# Patient Record
Sex: MALE | Race: WHITE | URBAN - METROPOLITAN AREA
[De-identification: names, ages, dates, MRNs, and addresses within clinical notes are randomized per-mention and may not be internally consistent; named-entity substitution may affect disease eponyms.]

---

## 2018-05-09 NOTE — PROGRESS NOTES
"  SUBJECTIVE:   Xiang David is a 23 year old male who presents to clinic today for the following health issues:      HPI     Concern - Cysts  Onset: 4 years?    Description:   \"covered in them\"  Arms, both upper and lower back, thighs    Progression of Symptoms:  worsening    Accompanying Signs & Symptoms:  Pain on some of them, herbert the ones on sides  One on left arm looks to be getting bigger    Previous history of similar problem:   YES - previously had 3 cysts removed that were noncancerous    Precipitating factors:   Worsened by: touching them    Alleviating factors:  Improved by: NA    Therapies Tried and outcome: NA    He states they are lipomas but they have become more bothersome over the years and he would like some of them removed.     Problem list and histories reviewed & adjusted, as indicated.  Additional history: none    ROS:  GENERAL: Denies fever, fatigue, weakness, weight gain, or weight loss.   SKIN: +Multiple lipomas, some which are painful.    OBJECTIVE:     /62 (BP Location: Right arm, Patient Position: Chair, Cuff Size: Adult Regular)  Pulse 96  Temp 98.1  F (36.7  C) (Temporal)  Resp 14  Ht 6' (1.829 m)  Wt 241 lb (109.3 kg)  SpO2 98%  BMI 32.69 kg/m2  Body mass index is 32.69 kg/(m^2).  GENERAL: healthy, alert and no distress  SKIN: +Multiple, rubbery, subcutaneous nodules covering bilateral arms, chest, flanks and thighs.     ASSESSMENT/PLAN:       ICD-10-CM    1. Lipomatosis E88.2 GENERAL SURG ADULT REFERRAL       Patient has dozens of lipomas all over his body, some of which are painful. Will send to general surgery for further evaluation to discuss removal.       Hilario Townsend PA-C  [facility]"

## 2018-05-16 ENCOUNTER — OFFICE VISIT (OUTPATIENT)
Dept: FAMILY MEDICINE | Facility: OTHER | Age: 23
End: 2018-05-16
Payer: COMMERCIAL

## 2018-05-16 VITALS
OXYGEN SATURATION: 98 % | HEIGHT: 72 IN | DIASTOLIC BLOOD PRESSURE: 62 MMHG | SYSTOLIC BLOOD PRESSURE: 128 MMHG | HEART RATE: 96 BPM | RESPIRATION RATE: 14 BRPM | BODY MASS INDEX: 32.64 KG/M2 | TEMPERATURE: 98.1 F | WEIGHT: 241 LBS

## 2018-05-16 DIAGNOSIS — E88.2 LIPOMATOSIS: Primary | ICD-10-CM

## 2018-05-16 PROCEDURE — 99202 OFFICE O/P NEW SF 15 MIN: CPT | Performed by: PHYSICIAN ASSISTANT

## 2018-05-16 ASSESSMENT — PAIN SCALES - GENERAL: PAINLEVEL: NO PAIN (0)

## 2018-05-16 NOTE — NURSING NOTE
Chief Complaint   Patient presents with     Derm Problem     Panel Management     tdap, gab, jesus, hiv screen        Initial /62 (BP Location: Right arm, Patient Position: Chair, Cuff Size: Adult Regular)  Pulse 96  Temp 98.1  F (36.7  C) (Temporal)  Resp 14  Ht 6' (1.829 m)  Wt 241 lb (109.3 kg)  SpO2 98%  BMI 32.69 kg/m2 Estimated body mass index is 32.69 kg/(m^2) as calculated from the following:    Height as of this encounter: 6' (1.829 m).    Weight as of this encounter: 241 lb (109.3 kg).  Medication Reconciliation: complete    Bell Caruso, CMA

## 2018-05-16 NOTE — MR AVS SNAPSHOT
After Visit Summary   5/16/2018    Xiang David    MRN: [id number]           Patient Information     Date Of Birth          1995        Visit Information        Provider Department      5/16/2018 3:00 PM Hilario Townsend PA-C [facility]        Today's Diagnoses     Lipomatosis    -  1      Care Instructions    Will send to general surgery to discuss removal of the lipomas.            Follow-ups after your visit        Additional Services     GENERAL SURG ADULT REFERRAL       Your provider has referred you to: [facility]: [facility] [phone]   [email or web address]    Dozens of lipomas, discuss removal of some that are bothersome    Please be aware that coverage of these services is subject to the terms and limitations of your health insurance plan.  Call member services at your health plan with any benefit or coverage questions.      Please bring the following with you to your appointment:    (1) Any X-Rays, CTs or MRIs which have been performed.  Contact the facility where they were done to arrange for  prior to your scheduled appointment.   (2) List of current medications   (3) This referral request   (4) Any documents/labs given to you for this referral                  Your next 10 appointments already scheduled     May 21, 2018  8:45 AM CDT   New Visit with Ronnie Olmos MD   [facility] ([facility])    [address] [place] [address] [address]  [facility] [id number] [phone]              Who to contact     If you have questions or need follow up information about today's clinic visit or your schedule please contact [facility] directly at [phone].  Normal or non-critical lab and imaging results will be communicated to you by MyChart, letter or phone within 4 business days after the clinic has received the results. If you do not hear from us within 7 days, please contact  "the clinic through SmartStarthart or phone. If you have a critical or abnormal lab result, we will notify you by phone as soon as possible.  Submit refill requests through Tabber or call your pharmacy and they will forward the refill request to us. Please allow 3 business days for your refill to be completed.          Additional Information About Your Visit        SmartStarthart Information     Tabber lets you send messages to your doctor, view your test results, renew your prescriptions, schedule appointments and more. To sign up, go to [email or web address] . Click on \"Log in\" on the left side of the screen, which will take you to the Welcome page. Then click on \"Sign up Now\" on the right side of the page.     You will be asked to enter the access code listed below, as well as some personal information. Please follow the directions to create your username and password.     Your access code is: [email or web address]  Expires: 2018  3:24 PM     Your access code will  in 90 days. If you need help or a new code, please call your [place] clinic or [phone].        Care EveryWhere ID     This is your Care EveryWhere ID. This could be used by other organizations to access your [place] medical records  [id number]        Your Vitals Were     Pulse Temperature Respirations Height Pulse Oximetry BMI (Body Mass Index)    96 98.1  F (36.7  C) (Temporal) 14 6' (1.829 m) 98% 32.69 kg/m2       Blood Pressure from Last 3 Encounters:   18 128/62   11 122/60   11 128/78    Weight from Last 3 Encounters:   18 241 lb (109.3 kg)   11 228 lb 4 oz (103.5 kg) (99 %)*   11 221 lb (100.2 kg) (98 %)*     * Growth percentiles are based on CDC 2-20 Years data.              We Performed the Following     GENERAL SURG ADULT REFERRAL        Primary Care Provider Office Phone # Fax #    Hira Usama Andrews [phone]       [address] [place] [address]        Equal Access to " Services     [facility]: Hadii aad ku hadharmonykymberly Sothiago, waaxda luqadaha, qaybta kaalmada jag, dina ziegler. So [facility] [phone].    ATENCIÓN: Si habla español, tiene a ramirez disposición servicios gratuitos de asistencia lingüística. Llame al [phone].    We comply with applicable federal civil rights laws and [place] laws. We do not discriminate on the basis of race, color, national origin, age, disability, sex, sexual orientation, or gender identity.            Thank you!     Thank you for choosing [facility]  for your care. Our goal is always to provide you with excellent care. Hearing back from our patients is one way we can continue to improve our services. Please take a few minutes to complete the written survey that you may receive in the mail after your visit with us. Thank you!             Your Updated Medication List - Protect others around you: Learn how to safely use, store and throw away your medicines at www.disposemymeds.org.          This list is accurate as of 5/16/18  3:24 PM.  Always use your most recent med list.                   Brand Name Dispense Instructions for use Diagnosis    amphetamine-dextroamphetamine 20 MG per 24 hr capsule    ADDERALL XR    30 capsule    Take 1 capsule by mouth daily. Generic okay    ADD (attention deficit disorder)

## 2018-05-21 ENCOUNTER — TELEPHONE (OUTPATIENT)
Dept: SURGERY | Facility: CLINIC | Age: 23
End: 2018-05-21

## 2018-05-21 ENCOUNTER — OFFICE VISIT (OUTPATIENT)
Dept: SURGERY | Facility: OTHER | Age: 23
End: 2018-05-21
Attending: PHYSICIAN ASSISTANT
Payer: COMMERCIAL

## 2018-05-21 VITALS
HEART RATE: 76 BPM | OXYGEN SATURATION: 99 % | WEIGHT: 246 LBS | BODY MASS INDEX: 33.32 KG/M2 | HEIGHT: 72 IN | SYSTOLIC BLOOD PRESSURE: 139 MMHG | DIASTOLIC BLOOD PRESSURE: 69 MMHG

## 2018-05-21 DIAGNOSIS — D17.9 LIPOMA, UNSPECIFIED SITE: Primary | ICD-10-CM

## 2018-05-21 PROCEDURE — 99243 OFF/OP CNSLTJ NEW/EST LOW 30: CPT | Performed by: SURGERY

## 2018-05-21 NOTE — MR AVS SNAPSHOT
"              After Visit Summary   2018    Xiang David    MRN: [id number]           Patient Information     Date Of Birth          1995        Visit Information        Provider Department      2018 8:45 AM Ronnie Olmos MD [facility]        Today's Diagnoses     Lipoma, unspecified site    -  1       Follow-ups after your visit        Who to contact     If you have questions or need follow up information about today's clinic visit or your schedule please contact [facility] directly at [phone].  Normal or non-critical lab and imaging results will be communicated to you by Adayanahart, letter or phone within 4 business days after the clinic has received the results. If you do not hear from us within 7 days, please contact the clinic through Adayanahart or phone. If you have a critical or abnormal lab result, we will notify you by phone as soon as possible.  Submit refill requests through The Noun Project or call your pharmacy and they will forward the refill request to us. Please allow 3 business days for your refill to be completed.          Additional Information About Your Visit        MyChart Information     [employer] lets you send messages to your doctor, view your test results, renew your prescriptions, schedule appointments and more. To sign up, go to [email or web address] . Click on \"Log in\" on the left side of the screen, which will take you to the Welcome page. Then click on \"Sign up Now\" on the right side of the page.     You will be asked to enter the access code listed below, as well as some personal information. Please follow the directions to create your username and password.     Your access code is: [email or web address]  Expires: 2018  3:24 PM     Your access code will  in 90 days. If you need help or a new code, please call your [facility] or [phone].        Care EveryWhere ID     This is your Care EveryWhere ID. This could be used by other " organizations to access your [place] medical records  [id number]        Your Vitals Were     Pulse Height Pulse Oximetry BMI (Body Mass Index)          76 1.829 m (6') 99% 33.36 kg/m2         Blood Pressure from Last 3 Encounters:   05/21/18 139/69   05/16/18 128/62   12/02/11 122/60    Weight from Last 3 Encounters:   05/21/18 111.6 kg (246 lb)   05/16/18 109.3 kg (241 lb)   12/02/11 103.5 kg (228 lb 4 oz) (99 %)*     * Growth percentiles are based on [facility] 2-20 Years data.              We Performed the Following     [place]-Operative Worksheet        Primary Care Provider Office Phone # Fax #    Hira Usama Andrews [phone]       [address] [place] [address]        Equal Access to Services     [facility]: Hadii wilson landiso Sothiago, waaxda luqadaha, qaybta kaalmada adewilmanyada, dina lainez . So [facility] [phone].    ATENCIÓN: Si habla español, tiene a ramirez disposición servicios gratuitos de asistencia lingüística. Llame al [phone].    We comply with applicable federal civil rights laws and [place] laws. We do not discriminate on the basis of race, color, national origin, age, disability, sex, sexual orientation, or gender identity.            Thank you!     Thank you for choosing [facility]  for your care. Our goal is always to provide you with excellent care. Hearing back from our patients is one way we can continue to improve our services. Please take a few minutes to complete the written survey that you may receive in the mail after your visit with us. Thank you!             Your Updated Medication List - Protect others around you: Learn how to safely use, store and throw away your medicines at www.disposemymeds.org.      Notice  As of 5/21/2018 10:26 AM    You have not been prescribed any medications.

## 2018-05-21 NOTE — LETTER
5/21/2018         RE: Xiang David  [address] [address] [address]        Dear Colleague,    Thank you for referring your patient, Xiang David, to the [facility]. Please see a copy of my visit note below.    General Surgery Consultation    Xiang David MRN# [id number]   Age: 23 year old YOB: 1995     Reason for consult: Multiple lipomas                        Assessment and Plan:   I was asked to see this patient at the request of PONCE Foster for evaluation of multiple lipomas.  Xiang David is a 23 year old male who presented with history, exam, laboratory and imaging most consistent with:       ICD-10-CM    1. Lipoma, unspecified site D17.9     multiple lipomas - bilateral upper extremities, trunk, lower extremities       Rene has multiple lipomas throughout his bilateral extremities trunk and lower extremities.  I recommend excision of the ones that are symptomatic or enlarging.  Thus we agreed on the following:   -left arm:  2.5x1.5cm posterior forearm; 2x1.5cm medial upper arm  -right arm: 3x2cm anterior mid forearm; 1.5x2cm medial anterior forearm  -left lateral abdomen at the posterior axillary line - 4x3cm  -right lateral chest wall - 3x3cm    I went through the risks benefits and alternatives with the patient of the surgery procedure -infection, bleeding, wound dehiscence, further surgery.  We talked about limitations after surgery including no lifting for at least 2-3 weeks.  Patient is not to submerge the incision under water like lakes, pools, hot tubs for 2.3 weeks.  We will review the sites above on the day of surgery prior to making to any incisions.      I thank PONCE Foster for the opportunity to participate in the patient's care.           Chief Complaint:   Multiple lipomas     History is obtained from the patient         History of Present Illness:   This patient is a 23 year old  male without a significant past medical  history who presents with multiple lipomas on bilateral upper extremities, lower extremities and truncal area.  Stated been there for many years, patient's been noticing that there are more of them the past few months.  Patient has had some [place] [employer] [place] and was told that they were benign.  And stated that his father has similar lesions throughout his body.  Patient stated there is a family history of skin cancer in his father and paternal grandpa -patient does not know what type of skin cancer they had.  She denies any enlarging of the lesions or any discharge from the lesions.  Patient stated the ones on his arm can be very tender especially if he pushes on it.  Several on the anterior abdominal wall, along the lateral aspect of the abdominal wall, in the lateral aspect of the chest wall.  He showed me the ones that are symptomatic and does cause him pain especially when he stands or bend over.  Pt wish to have these be removed.            Past Medical History:    has no past medical history on file.          Past Surgical History:   No past surgical history on file.        Medications:     No current outpatient prescriptions on file prior to visit.  No current facility-administered medications on file prior to visit.       Allergies:      Allergies   Allergen Reactions     No Known Drug Allergies             Social History:   Xiang David  reports that he has never smoked. His smokeless tobacco use includes Snuff. He reports that he drinks alcohol. He reports that he does not use illicit drugs.          Family History:   The patient has no family history of any bleeding, clotting or anesthesia problems.          Review of Systems:     Skin: positive for lumps or bumps  Eyes: negative, visual blurring, double vision  Ears/Nose/Throat: negative, hearing loss, deafness  Respiratory: No shortness of breath, dyspnea on exertion, cough, or hemoptysis  Cardiovascular: negative, palpitations,  tachycardia and irregular heart beat  Gastrointestinal: negative, nausea, vomiting and abdominal pain  Genitourinary: negative, hesitancy and hematuria  Musculoskeletal: negative, back pain and neck pain  Neurologic: negative, headaches and syncope  Psychiatric: negative  Hematologic/Lymphatic/Immunologic: negative  Endocrine: negative         Physical Exam:     Constitutional: Awake, alert, no acute distress.  Eyes:  No scleral icterus.  Conjunctiva are without injection.  ENMT: Mucous membranes moist, dentition and gums are intact.   Neck: Soft, supple, trachea midline.    Endocrine: The thyroid is without masses and mobile with swallow.   Lymphatic: There is no cervical or inguinal adenopathy.  Respiratory: Lungs are clear to auscultation and percussion bilaterally.   Cardiovascular: Regular rate and rhythm. No murmurs, rubs, or gallops.    Abdomen: Non-distended, non-tender, normoactive bowel sounds present, No masses, umbilical or inguinal hernias, or flank tenderness. No hepatosplenomegaly.   Musculoskeletal: No spinal or CVA tenderness. Full range of motion in the upper and lower extremities.    Skin: multiple palpable masses through his abdominal wall, chest, B/L LEs and UEs. No petechia.    Neurologic: Cranial nerves II through XII are grossly intact and symmetric.  Psychiatric: The patient is alert and oriented times 3.  The patient's affect is not blunted and mood is appropriate.          Data:   WBC - No results found for: WBC], HgB - Hemoglobin   Date Value Ref Range Status   08/29/2006 15.5 11.7 - 15.7 g/dL Final   ]   Liver Function Studies - No results for input(s): PROTTOTAL, ALBUMIN, BILITOTAL, ALKPHOS, AST, ALT, BILIDIRECT in the last 33719 hours.  No results found for this or any previous visit (from the past 744 hour(s)).     JuanKd Olmos DO 5/21/2018 10:12 AM           Again, thank you for allowing me to participate in the care of your patient.        Sincerely,        JuanKd Olmos MD

## 2018-05-21 NOTE — PROGRESS NOTES
General Surgery Consultation    Xiang David MRN# [id number]   Age: 23 year old YOB: 1995     Reason for consult: Multiple lipomas                        Assessment and Plan:   I was asked to see this patient at the request of PONCE Foster for evaluation of multiple lipomas.  Xiang David is a 23 year old male who presented with history, exam, laboratory and imaging most consistent with:       ICD-10-CM    1. Lipoma, unspecified site D17.9     multiple lipomas - bilateral upper extremities, trunk, lower extremities       Rene has multiple lipomas throughout his bilateral extremities trunk and lower extremities.  I recommend excision of the ones that are symptomatic or enlarging.  Thus we agreed on the following:   -left arm:  2.5x1.5cm posterior forearm; 2x1.5cm medial upper arm  -right arm: 3x2cm anterior mid forearm; 1.5x2cm medial anterior forearm  -left lateral abdomen at the posterior axillary line - 4x3cm  -right lateral chest wall - 3x3cm    I went through the risks benefits and alternatives with the patient of the surgery procedure -infection, bleeding, wound dehiscence, further surgery.  We talked about limitations after surgery including no lifting for at least 2-3 weeks.  Patient is not to submerge the incision under water like lakes, pools, hot tubs for 2.3 weeks.  We will review the sites above on the day of surgery prior to making to any incisions.      I thank PONCE Foster for the opportunity to participate in the patient's care.           Chief Complaint:   Multiple lipomas     History is obtained from the patient         History of Present Illness:   This patient is a 23 year old  male without a significant past medical history who presents with multiple lipomas on bilateral upper extremities, lower extremities and truncal area.  Stated been there for many years, patient's been noticing that there are more of them the past few months.  Patient has had some removed  [place] [employer] [place] and was told that they were benign.  And stated that his father has similar lesions throughout his body.  Patient stated there is a family history of skin cancer in his father and paternal grandpa -patient does not know what type of skin cancer they had.  She denies any enlarging of the lesions or any discharge from the lesions.  Patient stated the ones on his arm can be very tender especially if he pushes on it.  Several on the anterior abdominal wall, along the lateral aspect of the abdominal wall, in the lateral aspect of the chest wall.  He showed me the ones that are symptomatic and does cause him pain especially when he stands or bend over.  Pt wish to have these be removed.            Past Medical History:    has no past medical history on file.          Past Surgical History:   No past surgical history on file.        Medications:     No current outpatient prescriptions on file prior to visit.  No current facility-administered medications on file prior to visit.       Allergies:      Allergies   Allergen Reactions     No Known Drug Allergies             Social History:   Xiang David  reports that he has never smoked. His smokeless tobacco use includes Snuff. He reports that he drinks alcohol. He reports that he does not use illicit drugs.          Family History:   The patient has no family history of any bleeding, clotting or anesthesia problems.          Review of Systems:     Skin: positive for lumps or bumps  Eyes: negative, visual blurring, double vision  Ears/Nose/Throat: negative, hearing loss, deafness  Respiratory: No shortness of breath, dyspnea on exertion, cough, or hemoptysis  Cardiovascular: negative, palpitations, tachycardia and irregular heart beat  Gastrointestinal: negative, nausea, vomiting and abdominal pain  Genitourinary: negative, hesitancy and hematuria  Musculoskeletal: negative, back pain and neck pain  Neurologic: negative, headaches and  syncope  Psychiatric: negative  Hematologic/Lymphatic/Immunologic: negative  Endocrine: negative         Physical Exam:     Constitutional: Awake, alert, no acute distress.  Eyes:  No scleral icterus.  Conjunctiva are without injection.  ENMT: Mucous membranes moist, dentition and gums are intact.   Neck: Soft, supple, trachea midline.    Endocrine: The thyroid is without masses and mobile with swallow.   Lymphatic: There is no cervical or inguinal adenopathy.  Respiratory: Lungs are clear to auscultation and percussion bilaterally.   Cardiovascular: Regular rate and rhythm. No murmurs, rubs, or gallops.    Abdomen: Non-distended, non-tender, normoactive bowel sounds present, No masses, umbilical or inguinal hernias, or flank tenderness. No hepatosplenomegaly.   Musculoskeletal: No spinal or CVA tenderness. Full range of motion in the upper and lower extremities.    Skin: multiple palpable masses through his abdominal wall, chest, B/L LEs and UEs. No petechia.    Neurologic: Cranial nerves II through XII are grossly intact and symmetric.  Psychiatric: The patient is alert and oriented times 3.  The patient's affect is not blunted and mood is appropriate.          Data:   WBC - No results found for: WBC], HgB - Hemoglobin   Date Value Ref Range Status   08/29/2006 15.5 11.7 - 15.7 g/dL Final   ]   Liver Function Studies - No results for input(s): PROTTOTAL, ALBUMIN, BILITOTAL, ALKPHOS, AST, ALT, BILIDIRECT in the last 50602 hours.  No results found for this or any previous visit (from the past 744 hour(s)).     [facility] DO [place] 5/21/2018 10:12 AM

## 2018-05-23 NOTE — TELEPHONE ENCOUNTER
Type of surgery: excision of lipomas-left arm, right arm, abdomen  Location of surgery: [facility]   Date of surgery: 5/24/18  Surgeon: Dr. Olmos  Pre-Op Appt Date: done by Dr. Olmos  Post-Op Appt Date: 6/6/18   Packet sent out: Surgery packet mailed to patient's home address.   Pre-cert/Authorization completed: NA  Date: 5/23/2018    Tiff Yu  Surgery Scheduler

## 2018-05-24 ENCOUNTER — ANESTHESIA (OUTPATIENT)
Dept: SURGERY | Facility: CLINIC | Age: 23
End: 2018-05-24
Payer: COMMERCIAL

## 2018-05-24 ENCOUNTER — HOSPITAL ENCOUNTER (OUTPATIENT)
Facility: CLINIC | Age: 23
Discharge: HOME | End: 2018-05-24
Attending: SURGERY | Admitting: SURGERY
Payer: COMMERCIAL

## 2018-05-24 ENCOUNTER — ANESTHESIA EVENT (OUTPATIENT)
Dept: SURGERY | Facility: CLINIC | Age: 23
End: 2018-05-24
Payer: COMMERCIAL

## 2018-05-24 VITALS
HEIGHT: 72 IN | TEMPERATURE: 98.6 F | DIASTOLIC BLOOD PRESSURE: 84 MMHG | RESPIRATION RATE: 9 BRPM | BODY MASS INDEX: 33.32 KG/M2 | WEIGHT: 246 LBS | OXYGEN SATURATION: 97 % | SYSTOLIC BLOOD PRESSURE: 134 MMHG

## 2018-05-24 PROCEDURE — 25000125 ZZHC RX 250: Performed by: NURSE ANESTHETIST, CERTIFIED REGISTERED

## 2018-05-24 PROCEDURE — 71000014 ZZH RECOVERY PHASE 1 LEVEL 2 FIRST HR: Performed by: SURGERY

## 2018-05-24 PROCEDURE — 21552 EXC NECK LES SC 3 CM/>: CPT | Performed by: SURGERY

## 2018-05-24 PROCEDURE — 40000306 ZZH STATISTIC PRE PROC ASSESS II: Performed by: SURGERY

## 2018-05-24 PROCEDURE — 88304 TISSUE EXAM BY PATHOLOGIST: CPT | Performed by: SURGERY

## 2018-05-24 PROCEDURE — 25000128 H RX IP 250 OP 636: Performed by: NURSE ANESTHETIST, CERTIFIED REGISTERED

## 2018-05-24 PROCEDURE — 37000008 ZZH ANESTHESIA TECHNICAL FEE, 1ST 30 MIN: Performed by: SURGERY

## 2018-05-24 PROCEDURE — 36000052 ZZH SURGERY LEVEL 2 EA 15 ADDTL MIN: Performed by: SURGERY

## 2018-05-24 PROCEDURE — 88304 TISSUE EXAM BY PATHOLOGIST: CPT | Mod: 26 | Performed by: SURGERY

## 2018-05-24 PROCEDURE — 25075 EXC FOREARM LES SC < 3 CM: CPT | Mod: RT | Performed by: SURGERY

## 2018-05-24 PROCEDURE — 22902 EXC ABD LES SC < 3 CM: CPT | Mod: 51 | Performed by: SURGERY

## 2018-05-24 PROCEDURE — 71000027 ZZH RECOVERY PHASE 2 EACH 15 MINS: Performed by: SURGERY

## 2018-05-24 PROCEDURE — 24075 EXC ARM/ELBOW LES SC < 3 CM: CPT | Mod: LT | Performed by: SURGERY

## 2018-05-24 PROCEDURE — 25000564 ZZH DESFLURANE, EA 15 MIN: Performed by: SURGERY

## 2018-05-24 PROCEDURE — 36000050 ZZH SURGERY LEVEL 2 1ST 30 MIN: Performed by: SURGERY

## 2018-05-24 PROCEDURE — 37000009 ZZH ANESTHESIA TECHNICAL FEE, EACH ADDTL 15 MIN: Performed by: SURGERY

## 2018-05-24 PROCEDURE — 27210794 ZZH OR GENERAL SUPPLY STERILE: Performed by: SURGERY

## 2018-05-24 PROCEDURE — 25000128 H RX IP 250 OP 636: Performed by: SURGERY

## 2018-05-24 PROCEDURE — 25000132 ZZH RX MED GY IP 250 OP 250 PS 637: Performed by: SURGERY

## 2018-05-24 PROCEDURE — 25000125 ZZHC RX 250: Performed by: SURGERY

## 2018-05-24 RX ORDER — LIDOCAINE HYDROCHLORIDE AND EPINEPHRINE 10; 10 MG/ML; UG/ML
INJECTION, SOLUTION INFILTRATION; PERINEURAL PRN
Status: DISCONTINUED | OUTPATIENT
Start: 2018-05-24 | End: 2018-05-24 | Stop reason: HOSPADM

## 2018-05-24 RX ORDER — LIDOCAINE HYDROCHLORIDE 20 MG/ML
INJECTION, SOLUTION INFILTRATION; PERINEURAL PRN
Status: DISCONTINUED | OUTPATIENT
Start: 2018-05-24 | End: 2018-05-24

## 2018-05-24 RX ORDER — ONDANSETRON 2 MG/ML
INJECTION INTRAMUSCULAR; INTRAVENOUS PRN
Status: DISCONTINUED | OUTPATIENT
Start: 2018-05-24 | End: 2018-05-24

## 2018-05-24 RX ORDER — IBUPROFEN 600 MG/1
600 TABLET, FILM COATED ORAL
Status: COMPLETED | OUTPATIENT
Start: 2018-05-24 | End: 2018-05-24

## 2018-05-24 RX ORDER — CEFAZOLIN SODIUM 1 G/3ML
1 INJECTION, POWDER, FOR SOLUTION INTRAMUSCULAR; INTRAVENOUS SEE ADMIN INSTRUCTIONS
Status: DISCONTINUED | OUTPATIENT
Start: 2018-05-24 | End: 2018-05-24 | Stop reason: HOSPADM

## 2018-05-24 RX ORDER — SODIUM CHLORIDE, SODIUM LACTATE, POTASSIUM CHLORIDE, CALCIUM CHLORIDE 600; 310; 30; 20 MG/100ML; MG/100ML; MG/100ML; MG/100ML
INJECTION, SOLUTION INTRAVENOUS CONTINUOUS
Status: DISCONTINUED | OUTPATIENT
Start: 2018-05-24 | End: 2018-05-24 | Stop reason: HOSPADM

## 2018-05-24 RX ORDER — FENTANYL CITRATE 50 UG/ML
INJECTION, SOLUTION INTRAMUSCULAR; INTRAVENOUS PRN
Status: DISCONTINUED | OUTPATIENT
Start: 2018-05-24 | End: 2018-05-24

## 2018-05-24 RX ORDER — LIDOCAINE 40 MG/G
CREAM TOPICAL
Status: DISCONTINUED | OUTPATIENT
Start: 2018-05-24 | End: 2018-05-24 | Stop reason: HOSPADM

## 2018-05-24 RX ORDER — PROPOFOL 10 MG/ML
INJECTION, EMULSION INTRAVENOUS CONTINUOUS PRN
Status: DISCONTINUED | OUTPATIENT
Start: 2018-05-24 | End: 2018-05-24

## 2018-05-24 RX ORDER — CEFAZOLIN SODIUM 2 G/100ML
2 INJECTION, SOLUTION INTRAVENOUS
Status: COMPLETED | OUTPATIENT
Start: 2018-05-24 | End: 2018-05-24

## 2018-05-24 RX ORDER — BUPIVACAINE HYDROCHLORIDE AND EPINEPHRINE 5; 5 MG/ML; UG/ML
INJECTION, SOLUTION PERINEURAL PRN
Status: DISCONTINUED | OUTPATIENT
Start: 2018-05-24 | End: 2018-05-24 | Stop reason: HOSPADM

## 2018-05-24 RX ORDER — HYDROCODONE BITARTRATE AND ACETAMINOPHEN 5; 325 MG/1; MG/1
1 TABLET ORAL
Status: COMPLETED | OUTPATIENT
Start: 2018-05-24 | End: 2018-05-24

## 2018-05-24 RX ORDER — PROPOFOL 10 MG/ML
INJECTION, EMULSION INTRAVENOUS PRN
Status: DISCONTINUED | OUTPATIENT
Start: 2018-05-24 | End: 2018-05-24

## 2018-05-24 RX ADMIN — PROPOFOL 200 MG: 10 INJECTION, EMULSION INTRAVENOUS at 13:45

## 2018-05-24 RX ADMIN — LIDOCAINE HYDROCHLORIDE 3 ML: 10 INJECTION, SOLUTION EPIDURAL; INFILTRATION; INTRACAUDAL; PERINEURAL at 12:42

## 2018-05-24 RX ADMIN — SODIUM CHLORIDE, POTASSIUM CHLORIDE, SODIUM LACTATE AND CALCIUM CHLORIDE: 600; 310; 30; 20 INJECTION, SOLUTION INTRAVENOUS at 12:42

## 2018-05-24 RX ADMIN — PROPOFOL 100 MG: 10 INJECTION, EMULSION INTRAVENOUS at 13:30

## 2018-05-24 RX ADMIN — FENTANYL CITRATE 50 MCG: 50 INJECTION, SOLUTION INTRAMUSCULAR; INTRAVENOUS at 13:30

## 2018-05-24 RX ADMIN — FENTANYL CITRATE 50 MCG: 50 INJECTION, SOLUTION INTRAMUSCULAR; INTRAVENOUS at 13:40

## 2018-05-24 RX ADMIN — MIDAZOLAM 2 MG: 1 INJECTION INTRAMUSCULAR; INTRAVENOUS at 13:31

## 2018-05-24 RX ADMIN — HYDROCODONE BITARTRATE AND ACETAMINOPHEN 1 TABLET: 5; 325 TABLET ORAL at 16:12

## 2018-05-24 RX ADMIN — CEFAZOLIN SODIUM 2 G: 2 INJECTION, SOLUTION INTRAVENOUS at 13:31

## 2018-05-24 RX ADMIN — IBUPROFEN 600 MG: 600 TABLET ORAL at 16:12

## 2018-05-24 RX ADMIN — LIDOCAINE HYDROCHLORIDE 100 MG: 20 INJECTION, SOLUTION INFILTRATION; PERINEURAL at 13:32

## 2018-05-24 RX ADMIN — PROPOFOL 200 MCG/KG/MIN: 10 INJECTION, EMULSION INTRAVENOUS at 13:30

## 2018-05-24 RX ADMIN — ONDANSETRON 4 MG: 2 INJECTION INTRAMUSCULAR; INTRAVENOUS at 13:30

## 2018-05-24 ASSESSMENT — LIFESTYLE VARIABLES: TOBACCO_USE: 1

## 2018-05-24 NOTE — H&P (VIEW-ONLY)
General Surgery Consultation    Xiang David MRN# [id number]   Age: 23 year old YOB: 1995     Reason for consult: Multiple lipomas                        Assessment and Plan:   I was asked to see this patient at the request of PONCE Foster for evaluation of multiple lipomas.  Xiang David is a 23 year old male who presented with history, exam, laboratory and imaging most consistent with:       ICD-10-CM    1. Lipoma, unspecified site D17.9     multiple lipomas - bilateral upper extremities, trunk, lower extremities       Rene has multiple lipomas throughout his bilateral extremities trunk and lower extremities.  I recommend excision of the ones that are symptomatic or enlarging.  Thus we agreed on the following:   -left arm:  2.5x1.5cm posterior forearm; 2x1.5cm medial upper arm  -right arm: 3x2cm anterior mid forearm; 1.5x2cm medial anterior forearm  -left lateral abdomen at the posterior axillary line - 4x3cm  -right lateral chest wall - 3x3cm    I went through the risks benefits and alternatives with the patient of the surgery procedure -infection, bleeding, wound dehiscence, further surgery.  We talked about limitations after surgery including no lifting for at least 2-3 weeks.  Patient is not to submerge the incision under water like lakes, pools, hot tubs for 2.3 weeks.  We will review the sites above on the day of surgery prior to making to any incisions.      I thank PONCE Foster for the opportunity to participate in the patient's care.           Chief Complaint:   Multiple lipomas     History is obtained from the patient         History of Present Illness:   This patient is a 23 year old  male without a significant past medical history who presents with multiple lipomas on bilateral upper extremities, lower extremities and truncal area.  Stated been there for many years, patient's been noticing that there are more of them the past few months.  Patient has had some removed  in [employer] in [employer] and was told that they were benign.  And stated that his father has similar lesions throughout his body.  Patient stated there is a family history of skin cancer in his father and paternal grandpa -patient does not know what type of skin cancer they had.  She denies any enlarging of the lesions or any discharge from the lesions.  Patient stated the ones on his arm can be very tender especially if he pushes on it.  Several on the anterior abdominal wall, along the lateral aspect of the abdominal wall, in the lateral aspect of the chest wall.  He showed me the ones that are symptomatic and does cause him pain especially when he stands or bend over.  Pt wish to have these be removed.            Past Medical History:    has no past medical history on file.          Past Surgical History:   No past surgical history on file.        Medications:     No current outpatient prescriptions on file prior to visit.  No current facility-administered medications on file prior to visit.       Allergies:      Allergies   Allergen Reactions     No Known Drug Allergies             Social History:   Xiang David  reports that he has never smoked. His smokeless tobacco use includes Snuff. He reports that he drinks alcohol. He reports that he does not use illicit drugs.          Family History:   The patient has no family history of any bleeding, clotting or anesthesia problems.          Review of Systems:     Skin: positive for lumps or bumps  Eyes: negative, visual blurring, double vision  Ears/Nose/Throat: negative, hearing loss, deafness  Respiratory: No shortness of breath, dyspnea on exertion, cough, or hemoptysis  Cardiovascular: negative, palpitations, tachycardia and irregular heart beat  Gastrointestinal: negative, nausea, vomiting and abdominal pain  Genitourinary: negative, hesitancy and hematuria  Musculoskeletal: negative, back pain and neck pain  Neurologic: negative, headaches and  syncope  Psychiatric: negative  Hematologic/Lymphatic/Immunologic: negative  Endocrine: negative         Physical Exam:     Constitutional: Awake, alert, no acute distress.  Eyes:  No scleral icterus.  Conjunctiva are without injection.  ENMT: Mucous membranes moist, dentition and gums are intact.   Neck: Soft, supple, trachea midline.    Endocrine: The thyroid is without masses and mobile with swallow.   Lymphatic: There is no cervical or inguinal adenopathy.  Respiratory: Lungs are clear to auscultation and percussion bilaterally.   Cardiovascular: Regular rate and rhythm. No murmurs, rubs, or gallops.    Abdomen: Non-distended, non-tender, normoactive bowel sounds present, No masses, umbilical or inguinal hernias, or flank tenderness. No hepatosplenomegaly.   Musculoskeletal: No spinal or CVA tenderness. Full range of motion in the upper and lower extremities.    Skin: multiple palpable masses through his abdominal wall, chest, B/L LEs and UEs. No petechia.    Neurologic: Cranial nerves II through XII are grossly intact and symmetric.  Psychiatric: The patient is alert and oriented times 3.  The patient's affect is not blunted and mood is appropriate.          Data:   WBC - No results found for: WBC], HgB - Hemoglobin   Date Value Ref Range Status   08/29/2006 15.5 11.7 - 15.7 g/dL Final   ]   Liver Function Studies - No results for input(s): PROTTOTAL, ALBUMIN, BILITOTAL, ALKPHOS, AST, ALT, BILIDIRECT in the last 36458 hours.  No results found for this or any previous visit (from the past 744 hour(s)).     [facility] DO Amarilis 5/21/2018 10:12 AM

## 2018-05-24 NOTE — DISCHARGE INSTRUCTIONS
[facility]    Home Care Following AMBULATORY SURGERY    Dr. Mcdanielsh Olmos    Care of the Incision:    Dermabond dressing is in place.  No other dressing is needed.    On post-op day 1 you may shower, but don t soak in a tub or swim for at least 1 week.  Gently pat your incision dry with a freshly laundered towel.    Do not pick at your incision.    Leave your incision open to air.  Cover it only if clothing rubs or irritates it.    ______________________________________________________    Gauze dressing is in place.  Remove the dressing in 24 hours (replace it earlier if it is heavily soiled).  At 24 hours you may wash it in the shower with soap and water, but do not soak in a tub or swim for at least 1 week.  Gently pat your incision dry with a freshly laundered towel.  Either replace the dressing or leave it open to air according to your doctor's instructions.    ______________________________________________________  Activity:    Gradually increase your activity.  Walk short distances several times each day and increase the distance as your strength allows.    To promote circulation, do not cross your legs while sitting.    No strenuous lifting (20 pounds) or straining for 6 weeks (2 weeks for laparoscopic surgery).      Return to work will be determined by the type of work you do and should be discussed with your physician.    By 2 weeks after surgery, you may do any non-strenuous activity you feel like doing as long as you STOP IF IT HURTS.    Do not drive or operate equipment while taking prescription pain medicines.  You may drive 1 week after surgery if you have stopped taking prescription pain medicines and are pain-free enough to make an emergency stop if necessary.    Diet:    Return to the diet you were on before surgery.    Drink plenty of  water and fruit juices.    Avoid foods that cause constipation.      REMEMBER--most prescription pain pills cause constipation.  Walking, extra  fluids, and increased fiber (fresh fruits and vegetables, etc.) are natural remedies for constipation.  Ask your doctor about a stool softener such as Colace or Metamucil.    Call Your Physician if You Have:    Redness, increased swelling or cloudy drainage from your incision.    A temperature of more than 101 degrees F.    Worsening pain in your incision not relieved by your prescription pain pills and/or a short rest.  Persistent nausea/vomiting. Jaundice. Worsening abdominal pain. Shortness of breath or difficulty swallowing.    Any questions or concerns about your recovery, please call [phone].    Follow-up Care:    Make an appointment 1 week after your surgery.  Call [phone].

## 2018-05-24 NOTE — IP AVS SNAPSHOT
MRN:[id number]                      After Visit Summary   5/24/2018    Xiang David    MRN: [id number]           Thank you!     Thank you for choosing [facility] for your care. Our goal is always to provide you with excellent care. Hearing back from our patients is one way we can continue to improve our services. Please take a few minutes to complete the written survey that you may receive in the mail after you visit with us. Thank you!        Patient Information     Date Of Birth          1995        About your hospital stay     You were admitted on:  May 24, 2018 You last received care in the:  [facility] Phase II    You were discharged on:  May 24, 2018       Who to Call     For medical emergencies, please call 911.  For non-urgent questions about your medical care, please call your primary care provider or clinic, [phone]  For questions related to your surgery, please call your surgery clinic        Attending Provider     Provider Specialty    Amarilis, MD [facility]       Primary Care Provider Office Phone # Fax #    Hira Usama Andrews [phone]      Your next 10 appointments already scheduled     Jun 06, 2018  9:00 AM CDT   [facility]   [facility]    [address] [place] [address] [address]  [facility] [address] [phone]              Further instructions from your care team         [facility]    Home Care Following AMBULATORY SURGERY    Dr. Ronnie Olmos    Care of the Incision:    Dermabond dressing is in place.  No other dressing is needed.    On post-op day 1 you may shower, but don t soak in a tub or swim for at least 1 week.  Gently pat your incision dry with a freshly laundered towel.    Do not pick at your incision.    Leave your incision open to air.  Cover it only if clothing rubs or irritates it.    ______________________________________________________    Gauze  dressing is in place.  Remove the dressing in 24 hours (replace it earlier if it is heavily soiled).  At 24 hours you may wash it in the shower with soap and water, but do not soak in a tub or swim for at least 1 week.  Gently pat your incision dry with a freshly laundered towel.  Either replace the dressing or leave it open to air according to your doctor's instructions.    ______________________________________________________  Activity:    Gradually increase your activity.  Walk short distances several times each day and increase the distance as your strength allows.    To promote circulation, do not cross your legs while sitting.    No strenuous lifting (20 pounds) or straining for 6 weeks (2 weeks for laparoscopic surgery).      Return to work will be determined by the type of work you do and should be discussed with your physician.    By 2 weeks after surgery, you may do any non-strenuous activity you feel like doing as long as you STOP IF IT HURTS.    Do not drive or operate equipment while taking prescription pain medicines.  You may drive 1 week after surgery if you have stopped taking prescription pain medicines and are pain-free enough to make an emergency stop if necessary.    Diet:    Return to the diet you were on before surgery.    Drink plenty of  water and fruit juices.    Avoid foods that cause constipation.      REMEMBER--most prescription pain pills cause constipation.  Walking, extra fluids, and increased fiber (fresh fruits and vegetables, etc.) are natural remedies for constipation.  Ask your doctor about a stool softener such as Colace or Metamucil.    Call Your Physician if You Have:    Redness, increased swelling or cloudy drainage from your incision.    A temperature of more than 101 degrees F.    Worsening pain in your incision not relieved by your prescription pain pills and/or a short rest.  Persistent nausea/vomiting. Jaundice. Worsening abdominal pain. Shortness of breath or  "difficulty swallowing.    Any questions or concerns about your recovery, please call [phone].    Follow-up Care:    Make an appointment 1 week after your surgery.  Call [phone].      Pending Results     Date and Time Order Name Status Description    2018 1402 Surgical pathology exam In process             Admission Information     Date & Time Provider Department Dept. Phone    2018 Ronnie Olmos MD [facility] Phase [phone]      Your Vitals Were     Blood Pressure Temperature Respirations Height Weight Pulse Oximetry    129/71 98.6  F (37  C) (Oral) 9 1.829 m (6' 0.01\") 111.6 kg (246 lb) 98%    BMI (Body Mass Index)                   33.36 kg/m2           [employer] Information     [employer] lets you send messages to your doctor, view your test results, renew your prescriptions, schedule appointments and more. To sign up, go to [email or web address] . Click on \"Log in\" on the left side of the screen, which will take you to the Welcome page. Then click on \"Sign up Now\" on the right side of the page.     You will be asked to enter the access code listed below, as well as some personal information. Please follow the directions to create your username and password.     Your access code is: [email or web address]  Expires: 2018  3:24 PM     Your access code will  in 90 days. If you need help or a new code, please call your [facility] clinic or [phone].        Care EveryWhere ID     This is your Care EveryWhere ID. This could be used by other organizations to access your [facility] medical records  [id number]        Equal Access to Services     MICHELLE COTO : Hadii wilson Garcia, laura north, dina salas. So [facility] [phone].    ATENCIÓN: Si habla español, tiene a ramirez disposición servicios gratuitos de asistencia lingüística. Llame al [phone].    We comply with applicable federal civil rights laws and [place] " laws. We do not discriminate on the basis of race, color, national origin, age, disability, sex, sexual orientation, or gender identity.               Review of your medicines      Notice     You have not been prescribed any medications.             Protect others around you: Learn how to safely use, store and throw away your medicines at www.disposemymeds.org.             Medication List: This is a list of all your medications and when to take them. Check marks below indicate your daily home schedule. Keep this list as a reference.      Notice     You have not been prescribed any medications.

## 2018-05-24 NOTE — ANESTHESIA CARE TRANSFER NOTE
Patient: Xiang David    Procedure(s):  excision of lipomas - left arm, right arm, abdomen - Wound Class: I-Clean   - Wound Class: I-Clean    Diagnosis: multiple lipomas - bilateral upper extremities, trunk, lower extremities  Diagnosis Additional Information: No value filed.    Anesthesia Type:   MAC, General     Note:  Airway :Room Air  Patient transferred to:PACU  Handoff Report: Identifed the Patient, Identified the Reponsible Provider, Reviewed the pertinent medical history, Discussed the surgical course, Reviewed Intra-OP anesthesia mangement and issues during anesthesia, Set expectations for post-procedure period and Allowed opportunity for questions and acknowledgement of understanding      Vitals: (Last set prior to Anesthesia Care Transfer)    CRNA VITALS  5/24/2018 1445 - 5/24/2018 1545      5/24/2018             SpO2: 96 %                Electronically Signed By: MIKO Miller CRNA  May 24, 2018  4:48 PM

## 2018-05-24 NOTE — OP NOTE
Procedure Date: 05/24/2018      PREOPERATIVE DIAGNOSIS:  Multiple lipomas.      POSTOPERATIVE DIAGNOSIS:  Multiple lipomas.      PROCEDURE:     1.  Excision of multiple lipomas.  Please see the specimen for the complete list.     2.  Primary intermediate wound closure for all his incisions with ranges from 2-4cms.      ATTENDING SURGEON: Ronnie Olmos DO      ASSISTANT:  First Lisa Jenkins       ESTIMATED BLOOD LOSS:  10 mL.      SPECIMEN:   1.  Left medial upper arm measured as 2 x 1.5 x 1.   2.  Left posterior lower arm measured 2 x 1 x 0.5.   3.  Left lateral chest wall measured as 3 x 2 x 1.75.   4.  Left upper abdominal mass measured as 2 x 1.75 x 1 cm.   5.  Right chest wall mass measured as 3 x 2.5 x 1.   6.  Right distal arm mass measured 2 x 1 x 1.   7.  Right proximal forearm mass measured as 2.75 x 1 x 0.5 cm.        INDICATIONS:  A 23-year-old male who presents with multiple lipomas.  The patient stated that he has had a few of these removed while he was in the [employer] a few years back.  He stated he still has more and he feels that some of them are increasing in size.  Thus, he would like to have them be removed.  Preoperatively, the patient and his father were present and I marked all the sites that the patient feels it is increasing in size or giving him some pain.  Seven areas were marked, and the patient agreed to have all these be excised.  I told him about the risks, benefits, and alternatives, and he noted acceptance of these and we proceeded with the above.      PROCEDURE IN DETAIL:  The patient was properly identified in the preoperative holding area.  The patient was then brought back to the operative suite and placed in a supine position.  Anesthesia was induced per Anesthesia protocol.  The patient was then prepped and draped in the usual sterile fashion.  A timeout was done to confirm the correct patient, positioning, and procedure.        We started the procedure with the right  medial upper arm mass, utilizing local anesthetic.  This area was infiltrated where it was obviously palpable.  An incision was made over the area of the palpable mass.  Once this was done, I got down through the subcutaneous tissue to the area of the tissue change.  Utilizing a curved stat, blunt dissection was done and I was able to squeeze through the obvious lipomas.  This was then sent to pathology.  The next 6 masses were done in similar fashion as listed in the specimen in that order.  Once all this was done, hemostasis was achieved and we closed all his incisions with 3-0 Monocryl in a simple interrupted buried fashion followed by a 4-0 Monocryl in a subcuticular running fashion.  The patient tolerated the procedure well.  Local anesthetic was used prior to the incision on all of these incisions.  The patient was then cleaned and dried.  Dermabond was then used to cover the incisions.        At this point, the procedure was complete.  The patient tolerated the procedure well.         NUNO-IRENA MORRISON, DO             D: 2018   T: 2018   MT: NTS      Name:     WILLOW SOLITARIO   MRN:      [id number]        Account:        [id number]   :      1995           Procedure Date: 2018      Document: [id number]       cc: Hilario Andrews III, MD

## 2018-05-24 NOTE — BRIEF OP NOTE
[facility]    Pre-operative diagnosis: multiple lipomas - bilateral upper extremities, trunk, lower extremities          Post-operative diagnosis same   Procedure: Procedure(s):  excision of lipomas - left arm, right arm, abdomen - Wound Class: I-Clean   - Wound Class: I-Clean   Surgeon(s): Surgeon(s) and Role:     * Ronnie Olmos MD - Primary   Estimated blood loss: 10cc    Specimens:   ID Type Source Tests Collected by Time Destination   A : Left medial upper arm mass  2x1.5x1 Tissue Arm, Left SURGICAL PATHOLOGY EXAM Ronnie Olmos MD 5/24/2018  1:59 PM    B : Left posteroir lower arm mass 2x1x0.5 cm Tissue Arm, Left SURGICAL PATHOLOGY EXAM Ronnie Olmos MD 5/24/2018  2:02 PM    C : Left lateral chest wall mass 3x2x1.75 cm Tissue Chest SURGICAL PATHOLOGY EXAM Ronnie Olmos MD 5/24/2018  2:05 PM    D : Left upper Abdomen mass 2x1.75x1 cm Tissue Abdomen SURGICAL PATHOLOGY EXAM Ronnie Olmos MD 5/24/2018  2:08 PM    E : Right chest wall mass 3x2.5x1 cm Tissue Chest SURGICAL PATHOLOGY EXAM Ronnie Olmos MD 5/24/2018  2:20 PM    F : Right distal forearm mass 2x1x1 cm Tissue Arm, Right SURGICAL PATHOLOGY EXAM Ronnie Olmos MD 5/24/2018  2:36 PM    G : Right proximal forearm mass 2.75x1x0.5 cm Tissue Arm, Right SURGICAL PATHOLOGY EXAM Ronnie Olmos MD 5/24/2018  2:43 PM       Findings: As above.       Ronnie Olmos DO  [facility]      [id number]

## 2018-05-24 NOTE — ANESTHESIA PREPROCEDURE EVALUATION
Anesthesia Evaluation     . Pt has had prior anesthetic. Type: MAC           ROS/MED HX    ENT/Pulmonary:     (+)tobacco use, Past use , . .    Neurologic:  - neg neurologic ROS     Cardiovascular:  - neg cardiovascular ROS       METS/Exercise Tolerance:     Hematologic:  - neg hematologic  ROS       Musculoskeletal:  - neg musculoskeletal ROS       GI/Hepatic:  - neg GI/hepatic ROS       Renal/Genitourinary:  - ROS Renal section negative       Endo:  - neg endo ROS       Psychiatric:  - neg psychiatric ROS       Infectious Disease:  - neg infectious disease ROS       Malignancy:      - no malignancy   Other:    - neg other ROS                 Physical Exam  Normal systems: cardiovascular, pulmonary and dental    Airway   Mallampati: II  TM distance: >3 FB  Neck ROM: full    Dental     Cardiovascular   Rhythm and rate: regular and normal      Pulmonary    breath sounds clear to auscultation                    Anesthesia Plan      History & Physical Review  History and physical reviewed and following examination; no interval change.    ASA Status:  1 .    NPO Status:  > 6 hours    Plan for MAC with Propofol induction. Maintenance will be TIVA.  Reason for MAC:  Deep or markedly invasive procedure (G8)  PONV prophylaxis:  Ondansetron (or other 5HT-3)       Postoperative Care  Postoperative pain management:  IV analgesics.      Consents  Anesthetic plan, risks, benefits and alternatives discussed with:  Patient.  Use of blood products discussed: No .   .                          .

## 2018-05-24 NOTE — ANESTHESIA POSTPROCEDURE EVALUATION
Patient: Xiang David    Procedure(s):  excision of lipomas - left arm, right arm, abdomen - Wound Class: I-Clean   - Wound Class: I-Clean    Diagnosis:multiple lipomas - bilateral upper extremities, trunk, lower extremities  Diagnosis Additional Information: No value filed.    Anesthesia Type:  MAC, General    Note:  Anesthesia Post Evaluation    Patient location during evaluation: Phase 2  Patient participation: Able to fully participate in evaluation  Level of consciousness: awake  Pain management: adequate  Airway patency: patent  Cardiovascular status: acceptable and hemodynamically stable  Respiratory status: acceptable, room air and nonlabored ventilation  Hydration status: stable  PONV: none     Anesthetic complications: None    Comments: Patient was happy with the anesthesia care received and no anesthesia related complications were noted.  I will follow up with the patient again if it is needed.        Last vitals:  Vitals:    05/24/18 1545 05/24/18 1600 05/24/18 1615   BP: 129/71 133/78 134/84   Resp:      Temp:      SpO2:  97% 97%         Electronically Signed By: MIKO Miller CRNA  May 24, 2018  4:49 PM

## 2018-05-24 NOTE — IP AVS SNAPSHOT
[facility] Phase II    [address]     [place] [address]    Phone:  [phone]                                       After Visit Summary   5/24/2018    Xiang David    MRN: [id number]           After Visit Summary Signature Page     I have received my discharge instructions, and my questions have been answered. I have discussed any challenges I see with this plan with the nurse or doctor.    ..........................................................................................................................................  Patient/Patient Representative Signature      ..........................................................................................................................................  Patient Representative Print Name and Relationship to Patient    ..................................................               ................................................  Date                                            Time    ..........................................................................................................................................  Reviewed by Signature/Title    ...................................................              ..............................................  Date                                                            Time

## 2018-05-24 NOTE — INTERVAL H&P NOTE
The History and Physical has been reviewed, the patient has been examined and no changes have occurred in the patient's condition since the H & P was completed.     I marked all the sites where he wish to have the masses be excised with the patient and his dad.  They both agreed with these sites and wish to proceed with the above.  Pt is cleared for the procedure.     [facility], DO

## 2018-05-30 LAB — COPATH REPORT: NORMAL

## 2018-06-06 ENCOUNTER — TELEPHONE (OUTPATIENT)
Dept: SURGERY | Facility: OTHER | Age: 23
End: 2018-06-06

## 2018-06-06 NOTE — TELEPHONE ENCOUNTER
VM left with call back number [phone], patient NO SHOW scheduled post-op appointment, when patient calls back assist with scheduling a post-op appointment with Dr. Olmos......................................Latonya Kirk CMA  ([facility])

## 2018-06-11 ENCOUNTER — OFFICE VISIT (OUTPATIENT)
Dept: SURGERY | Facility: OTHER | Age: 23
End: 2018-06-11
Payer: COMMERCIAL

## 2018-06-11 VITALS
SYSTOLIC BLOOD PRESSURE: 126 MMHG | TEMPERATURE: 98.5 F | OXYGEN SATURATION: 100 % | HEART RATE: 76 BPM | WEIGHT: 243 LBS | DIASTOLIC BLOOD PRESSURE: 60 MMHG | BODY MASS INDEX: 32.95 KG/M2

## 2018-06-11 DIAGNOSIS — D17.9 LIPOMA, UNSPECIFIED SITE: ICD-10-CM

## 2018-06-11 DIAGNOSIS — Z09 S/P EXCISION OF SKIN LESION, FOLLOW-UP EXAM: Primary | ICD-10-CM

## 2018-06-11 PROCEDURE — 99024 POSTOP FOLLOW-UP VISIT: CPT | Performed by: SURGERY

## 2018-06-11 ASSESSMENT — PAIN SCALES - GENERAL: PAINLEVEL: NO PAIN (0)

## 2018-06-11 NOTE — PROGRESS NOTES
[facility] FOLLOW-UP NOTE  GENERAL SURGERY    PCP: Hira Andrews         Assessment and Plan:   Assessment:   Xiang David is a 23 year old male who presented post operatively from 5/24/18 excision of multiple lipomas and is doing very well.       ICD-10-CM    1. S/P excision of skin lesion, follow-up exam Z09    2. Lipoma, unspecified site D17.9     multiple sites - arms, extremities and trunk       I reviewed the pathology report today with the patient and answered all questions.    SPECIMEN(S):   A: Left medial upper arm mass   B: Left posterior lower arm mass   C: Left lateral chest wall mass   D: Left upper abdomen mass   E: Right chest wall mass   F: Right distal forearm mass   G: Right proximal forearm mass     FINAL DIAGNOSIS:   A. Left medial upper arm mass:   - Angiolipoma.     B. Left posterior lower arm mass:   - Angiolipoma.     C. Left lateral chest wall mass:   - Angiolipoma.     D. Left upper abdomen mass:   - Angiolipoma.     E. Right chest wall mass:   - Angiolipoma.     F. Right distal forearm mass:   - Angiolipoma.     G. Right proximal forearm mass:   - Angiolipoma.     COMMENT:   Angiolipomas are not infrequently associated with pain due to the presence    of fibrin thrombi as seen in these   specimens.     Electronically signed out by:     JACKI Metcalf M.D.     Plan:  He is healing well.  All his incisions are clean and intact.  There is a small suture that was sticking out on 1 of his left upper extremity that I removed.  She may return to working out and benching in the next week.  It has other lipomas however stated that he is asymptomatic and would not like to have them be removed.  I agree.         Subjective:     Xiang David is a 23 year old male who presents post operatively from 5/24/18 excision of multiple lipomas and is doing very well. . Pain has been controled. Currently is not using pain medications. Eating a Regular and having regular bladder/bowel function.  Overall doing very well.           Objective:         /60  Pulse 76  Temp 98.5  F (36.9  C)  Wt 110.2 kg (243 lb)  SpO2 100%  BMI 32.95 kg/m2   Constitutional: Awake, alert, in no acute distress.  Respiratory: Non-labored.   Cardiovascular: Regular rate and rhythm.   Abdomen: soft. Incisions are Healing well.    [facility] DO [place]  [facility]

## 2018-06-11 NOTE — MR AVS SNAPSHOT
"              After Visit Summary   2018    Xiang David    MRN: [id number]           Patient Information     Date Of Birth          1995        Visit Information        Provider Department      2018 3:00 PM Ronine Olmos MD [facility]        Today's Diagnoses     S/P excision of skin lesion, follow-up exam    -  1    Lipoma, unspecified site           Follow-ups after your visit        Who to contact     If you have questions or need follow up information about today's clinic visit or your schedule please contact [facility] directly at [phone].  Normal or non-critical lab and imaging results will be communicated to you by MyChart, letter or phone within 4 business days after the clinic has received the results. If you do not hear from us within 7 days, please contact the clinic through [employer] or phone. If you have a critical or abnormal lab result, we will notify you by phone as soon as possible.  Submit refill requests through [facility] or call your pharmacy and they will forward the refill request to us. Please allow 3 business days for your refill to be completed.          Additional Information About Your Visit        MyChart Information     [facility] lets you send messages to your doctor, view your test results, renew your prescriptions, schedule appointments and more. To sign up, go to [email or web address] . Click on \"Log in\" on the left side of the screen, which will take you to the Welcome page. Then click on \"Sign up Now\" on the right side of the page.     You will be asked to enter the access code listed below, as well as some personal information. Please follow the directions to create your username and password.     Your access code is: [email or web address]  Expires: 2018  3:24 PM     Your access code will  in 90 days. If you need help or a new code, please call your [facility] or [phone].        Care EveryWhere ID     This is your Care " EveryWhere ID. This could be used by other organizations to access your [place] medical records  [id number]        Your Vitals Were     Pulse Temperature Pulse Oximetry BMI (Body Mass Index)          76 98.5  F (36.9  C) 100% 32.95 kg/m2         Blood Pressure from Last 3 Encounters:   06/11/18 126/60   05/24/18 134/84   05/21/18 139/69    Weight from Last 3 Encounters:   06/11/18 110.2 kg (243 lb)   05/24/18 111.6 kg (246 lb)   05/21/18 111.6 kg (246 lb)              Today, you had the following     No orders found for display       Primary Care Provider Office Phone # Fax #    Hira Usama Andrews [phone]       [address] [place] [address]        Equal Access to Services     [facility]: Hadii wilson vásquez Sothiago, waaxda luqadaha, qaybta kaalmada jag, dina lainez . So [facility] [phone].    ATENCIÓN: Si habla español, tiene a ramirez disposición servicios gratuitos de asistencia lingüística. [facility] [phone].    We comply with applicable federal civil rights laws and [place] laws. We do not discriminate on the basis of race, color, national origin, age, disability, sex, sexual orientation, or gender identity.            Thank you!     Thank you for choosing [facility]  for your care. Our goal is always to provide you with excellent care. Hearing back from our patients is one way we can continue to improve our services. Please take a few minutes to complete the written survey that you may receive in the mail after your visit with us. Thank you!             Your Updated Medication List - Protect others around you: Learn how to safely use, store and throw away your medicines at www.disposemymeds.org.      Notice  As of 6/11/2018  4:57 PM    You have not been prescribed any medications.

## 2018-06-11 NOTE — NURSING NOTE
Type of surgery/procedure:  Excision of Lipomas  Date of surgery:  5/24/18  Are you experiencing pain in surgical area? No  Have you had a temperature since surgery? No  Incision closed with sutures.  Appearance: dry and intact---  One little stitch showing on left arm incision site  Drainage: No  Are there any other problems you would like to discuss?  No  Reviewed incision care with the patient?   Op

## 2018-06-11 NOTE — LETTER
6/11/2018         RE: Xiang David  [address] [address]        Dear Colleague,    Thank you for referring your patient, Xiang David, to the [facility]. Please see a copy of my visit note below.    [facility] FOLLOW-UP NOTE  GENERAL SURGERY    PCP: Hira Andrews         Assessment and Plan:   Assessment:   Xiang David is a 23 year old male who presented post operatively from 5/24/18 excision of multiple lipomas and is doing very well.       ICD-10-CM    1. S/P excision of skin lesion, follow-up exam Z09    2. Lipoma, unspecified site D17.9     multiple sites - arms, extremities and trunk       I reviewed the pathology report today with the patient and answered all questions.    SPECIMEN(S):   A: Left medial upper arm mass   B: Left posterior lower arm mass   C: Left lateral chest wall mass   D: Left upper abdomen mass   E: Right chest wall mass   F: Right distal forearm mass   G: Right proximal forearm mass     FINAL DIAGNOSIS:   A. Left medial upper arm mass:   - Angiolipoma.     B. Left posterior lower arm mass:   - Angiolipoma.     C. Left lateral chest wall mass:   - Angiolipoma.     D. Left upper abdomen mass:   - Angiolipoma.     E. Right chest wall mass:   - Angiolipoma.     F. Right distal forearm mass:   - Angiolipoma.     G. Right proximal forearm mass:   - Angiolipoma.     COMMENT:   Angiolipomas are not infrequently associated with pain due to the presence    of fibrin thrombi as seen in these   specimens.     Electronically signed out by:     JACKI Metcalf M.D.     Plan:  He is healing well.  All his incisions are clean and intact.  There is a small suture that was sticking out on 1 of his left upper extremity that I removed.  She may return to working out and benching in the next week.  It has other lipomas however stated that he is asymptomatic and would not like to have them be removed.  I agree.         Subjective:     Xiang David is  a 23 year old male who presents post operatively from 5/24/18 excision of multiple lipomas and is doing very well. . Pain has been controled. Currently is not using pain medications. Eating a Regular and having regular bladder/bowel function. Overall doing very well.           Objective:         /60  Pulse 76  Temp 98.5  F (36.9  C)  Wt 110.2 kg (243 lb)  SpO2 100%  BMI 32.95 kg/m2   Constitutional: Awake, alert, in no acute distress.  Respiratory: Non-labored.   Cardiovascular: Regular rate and rhythm.   Abdomen: soft. Incisions are Healing well.    Ronnie Olmos DO  [facility]              Again, thank you for allowing me to participate in the care of your patient.        Sincerely,        Ronnie Olmos MD

## (undated) DEVICE — SOL NACL 0.9% IRRIG 1000ML BOTTLE 07138-09

## (undated) DEVICE — SPONGE RAY-TEC 4X4" 7317

## (undated) DEVICE — ESU ELEC BLADE 2.75" COATED/INSULATED E1455

## (undated) DEVICE — SU MONOCRYL 4-0 PS-2 18" UND Y496G

## (undated) DEVICE — TUBING SUCTION 12"X1/4" N612

## (undated) DEVICE — PACK MINOR PROCEDURE CUSTOM

## (undated) DEVICE — GLOVE PROTEXIS W/NEU-THERA 6.0  2D73TE60

## (undated) DEVICE — DRSG STERI STRIP 1/2X4" R1547

## (undated) DEVICE — BLADE KNIFE SURG 15 371115

## (undated) DEVICE — PEN MARKING SKIN

## (undated) DEVICE — SYR BULB IRRIG DOVER 60 ML LATEX FREE 67000

## (undated) DEVICE — SU DERMABOND ADVANCED .7ML DNX12

## (undated) DEVICE — ESU CLEANER TIP 31142717

## (undated) DEVICE — SOL ADH LIQUID BENZOIN SWAB 0.6ML C1544

## (undated) DEVICE — SU MONOCRYL 3-0 PS-2 27" Y427H

## (undated) DEVICE — DRAPE LAP TRANSVERSE 29421

## (undated) DEVICE — NDL COUNTER 10CT

## (undated) DEVICE — BASIN SET MINOR DISP

## (undated) DEVICE — ESU PENCIL W/HOLSTER

## (undated) DEVICE — GLOVE ESTEEM POWDER FREE 5.5  2D72PL55

## (undated) DEVICE — PACK BASIC SET-UP 9101

## (undated) RX ORDER — BUPIVACAINE HYDROCHLORIDE 5 MG/ML
INJECTION, SOLUTION EPIDURAL; INTRACAUDAL
Status: DISPENSED
Start: 2018-05-24

## (undated) RX ORDER — ONDANSETRON 2 MG/ML
INJECTION INTRAMUSCULAR; INTRAVENOUS
Status: DISPENSED
Start: 2018-05-24

## (undated) RX ORDER — LIDOCAINE HYDROCHLORIDE AND EPINEPHRINE 10; 10 MG/ML; UG/ML
INJECTION, SOLUTION INFILTRATION; PERINEURAL
Status: DISPENSED
Start: 2018-05-24

## (undated) RX ORDER — FENTANYL CITRATE 50 UG/ML
INJECTION, SOLUTION INTRAMUSCULAR; INTRAVENOUS
Status: DISPENSED
Start: 2018-05-24

## (undated) RX ORDER — PROPOFOL 10 MG/ML
INJECTION, EMULSION INTRAVENOUS
Status: DISPENSED
Start: 2018-05-24

## (undated) RX ORDER — EPINEPHRINE 1 MG/ML
INJECTION, SOLUTION, CONCENTRATE INTRAVENOUS
Status: DISPENSED
Start: 2018-05-24